# Patient Record
Sex: FEMALE | Race: WHITE
[De-identification: names, ages, dates, MRNs, and addresses within clinical notes are randomized per-mention and may not be internally consistent; named-entity substitution may affect disease eponyms.]

---

## 2018-06-26 ENCOUNTER — HOSPITAL ENCOUNTER (OUTPATIENT)
Dept: HOSPITAL 47 - RADUSWWP | Age: 23
Discharge: HOME | End: 2018-06-26
Payer: COMMERCIAL

## 2018-06-26 DIAGNOSIS — R10.9: Primary | ICD-10-CM

## 2018-06-26 PROCEDURE — 76700 US EXAM ABDOM COMPLETE: CPT

## 2018-06-26 NOTE — US
EXAMINATION TYPE: US abdomen complete

 

DATE OF EXAM: 6/26/2018

 

COMPARISON: NONE

 

CLINICAL HISTORY: R10.9 Abdominal Pain unspecified. LUQ pain

 

EXAM MEASUREMENTS:

 

Liver Length:  15.2 cm   

Gallbladder Wall:  0.2 cm   

CBD:  0.4 cm

Spleen:  12.6 cm   

Right Kidney:  11.2 x 4.4 x 4.8 cm 

Left Kidney:  11.1 x 5.2 x 5.3 cm   

 

 

 

Pancreas:  visualized portions wnl

Liver:  wnl  

Gallbladder:  No stones seen

**Evidence for sonographic Chaudhari's sign:  No

CBD:  wnl 

Spleen:  measures upper limits of normal   

Right Kidney:  No hydronephrosis or masses seen   

Left Kidney:  No hydronephrosis or masses seen   

Upper IVC:  wnl  

Abd Aorta:  wnl

 

 

 

The liver is homogenous.  The intrahepatic portion of the IVC and proximal abdominal aorta are within
 normal limits.  There is no evidence of cholelithiasis.  Common bile duct is unremarkable.  The visu
alized portions of the pancreas are homogenous. Kidneys are symmetric and free of hydronephrosis.  No
 renal lesions are seen.

 

IMPRESSION: No significant abnormality appreciated at this time. Spleen is at the upper limits of nor
mal.

## 2018-10-04 ENCOUNTER — HOSPITAL ENCOUNTER (EMERGENCY)
Dept: HOSPITAL 47 - EC | Age: 23
Discharge: HOME | End: 2018-10-04
Payer: COMMERCIAL

## 2018-10-04 VITALS — RESPIRATION RATE: 18 BRPM | TEMPERATURE: 98.8 F

## 2018-10-04 VITALS — SYSTOLIC BLOOD PRESSURE: 135 MMHG | HEART RATE: 74 BPM | DIASTOLIC BLOOD PRESSURE: 78 MMHG

## 2018-10-04 DIAGNOSIS — T78.40XA: Primary | ICD-10-CM

## 2018-10-04 PROCEDURE — 99283 EMERGENCY DEPT VISIT LOW MDM: CPT

## 2018-10-04 NOTE — ED
General Adult HPI





- General


Chief complaint: Eye Problems


Stated complaint: Allergic reaction


Time Seen by Provider: 10/04/18 07:58


Source: patient, RN notes reviewed


Mode of arrival: ambulatory


Limitations: no limitations





- History of Present Illness


Initial comments: 





23-year-old female presents emergency Department chief complaint of eye swelling

, rash.  Patient states that over the last 24 hours she has developed a rash on 

her sides which resolved after Zyrtec and Benadryl.  Patient states she also 

has had some swelling around her eyes with no redness, pain fever or chills.  

She states her eyes feel slightly itchy states the rash is not itchy.  Denies 

any recent illnesses no fever no chills denies any nausea vomiting diarrhea 

constipation.  She hasn't had no recent strep infections, abdominal pain, 

dysuria.  She has no significant past medical history.  She states she only 

takes current birth control but states that sitting birth control she has been 

taking forever.





- Related Data


 Previous Rx's











 Medication  Instructions  Recorded


 


predniSONE 50 mg PO DAILY #4 tab 10/04/18











 Allergies











Allergy/AdvReac Type Severity Reaction Status Date / Time


 


No Known Allergies Allergy   Verified 10/04/18 07:51














Review of Systems


ROS Statement: 


Those systems with pertinent positive or pertinent negative responses have been 

documented in the HPI.





ROS Other: All systems not noted in ROS Statement are negative.





Past Medical History


Past Medical History: No Reported History


History of Any Multi-Drug Resistant Organisms: None Reported


Past Surgical History: Hernia Repair


Past Psychological History: No Psychological Hx Reported


Smoking Status: Never smoker


Past Alcohol Use History: None Reported


Past Drug Use History: None Reported





General Exam


Limitations: no limitations


General appearance: alert, in no apparent distress


Head exam: Present: atraumatic, normocephalic, normal inspection


Eye exam: Present: normal appearance, PERRL, EOMI, periorbital swelling (

Bilateral slightly worse on the right there is no erythema no warmth).  Absent: 

scleral icterus, conjunctival injection, periorbital tenderness


ENT exam: Present: normal exam, normal oropharynx, mucous membranes moist, TM's 

normal bilaterally, normal external ear exam


Neck exam: Present: normal inspection, full ROM.  Absent: tenderness, 

meningismus, lymphadenopathy


Respiratory exam: Present: normal lung sounds bilaterally.  Absent: respiratory 

distress, wheezes, rales, rhonchi, stridor


Cardiovascular Exam: Present: regular rate, normal rhythm, normal heart sounds.

  Absent: systolic murmur, diastolic murmur, rubs, gallop, clicks


Neurological exam: Present: alert, oriented X3, CN II-XII intact


Skin exam: Present: warm, dry, intact, normal color, rash (Faint macular 

erythematous rash noted on the sides of her abdomen and back)





Course





 Vital Signs











  10/04/18





  07:51


 


Temperature 98.8 F


 


Pulse Rate 108 H


 


Respiratory 18





Rate 


 


Blood Pressure 151/92


 


O2 Sat by Pulse 96





Oximetry 














Medical Decision Making





- Medical Decision Making





23-year-old female presents emergency Department for rash, eye swelling.  She 

appears to be having generalized ALLERGIC reaction rashes consistent with 

urticaria which symptoms did improve after taking antihistamines.  She will be 

given steroids and advised to continue antihistamines at home.





Disposition


Clinical Impression: 


 Allergic reaction





Disposition: HOME SELF-CARE


Condition: Stable


Instructions:  Allergies (ED), General Allergic Reaction (ED)


Additional Instructions: 


Please return to the Emergency Department if symptoms worsen or any other 

concerns.


Prescriptions: 


predniSONE 50 mg PO DAILY #4 tab


Is patient prescribed a controlled substance at d/c from ED?: No


Referrals: 


None,Stated [Primary Care Provider] - 1-2 days


Time of Disposition: 08:10

## 2020-01-14 ENCOUNTER — HOSPITAL ENCOUNTER (OUTPATIENT)
Dept: HOSPITAL 47 - RADUSWWP | Age: 25
Discharge: HOME | End: 2020-01-14
Attending: PHYSICIAN ASSISTANT
Payer: COMMERCIAL

## 2020-01-14 DIAGNOSIS — R16.2: Primary | ICD-10-CM

## 2020-01-14 DIAGNOSIS — R10.13: ICD-10-CM

## 2020-01-14 PROCEDURE — 76700 US EXAM ABDOM COMPLETE: CPT

## 2020-01-14 NOTE — US
EXAMINATION TYPE: US abdomen complete

 

DATE OF EXAM: 1/14/2020

 

COMPARISON: US 6/26/2018

 

CLINICAL HISTORY: R10.13 epigastric pain. Nausea and vomiting on and off for one week

 

EXAM MEASUREMENTS:

 

Liver Length:  19.3 cm   

Gallbladder Wall:  0.2 cm   

CBD:  0.3 cm

Spleen:  13.5 cm   

Right Kidney:  12.2 x 4.4 x 4.9 cm 

Left Kidney:  11.7 x 5.3 x 4.5 cm   

 

 

 

Pancreas:  Tail obscured by overlying bowel gas, visualized portions wnl

Liver:  Enlarged but homogeneous  

Gallbladder:  wnl

**Evidence for sonographic Chaudhari's sign:  No

CBD:  wnl 

Spleen:  Upper limits of normal   

Right Kidney:  No hydronephrosis or masses seen   

Left Kidney:  No hydronephrosis or masses seen   

Upper IVC:  wnl  

Abd Aorta:  wnl as visualized. Slightly obscured by bowel gas 

 

 

 

The liver is enlarged.  The intrahepatic portion of the IVC and proximal abdominal aorta are within n
ormal limits.  There is no evidence of cholelithiasis.  Common bile duct is unremarkable.  The visual
ized portions of the pancreas are homogenous.  The spleen is upper limits of normal.  Kidneys are sym
metric and free of hydronephrosis.  No renal lesions are seen.

 

IMPRESSION:

1. Upper limits of normal size of the spleen approaching criteria for splenomegaly.

2. Hepatomegaly.

3. No sonographic evidence of cholelithiasis nor acute cholecystitis.